# Patient Record
Sex: FEMALE | Race: WHITE | NOT HISPANIC OR LATINO | Employment: STUDENT | URBAN - METROPOLITAN AREA
[De-identification: names, ages, dates, MRNs, and addresses within clinical notes are randomized per-mention and may not be internally consistent; named-entity substitution may affect disease eponyms.]

---
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
## 2019-03-11 ENCOUNTER — APPOINTMENT (OUTPATIENT)
Dept: LAB | Age: 19
End: 2019-03-11

## 2020-06-10 PROCEDURE — 88175 CYTOPATH C/V AUTO FLUID REDO: CPT | Performed by: CLINICAL MEDICAL LABORATORY

## 2020-06-11 ENCOUNTER — LAB REQUISITION (OUTPATIENT)
Dept: LAB | Age: 20
End: 2020-06-11

## 2020-06-11 DIAGNOSIS — Z01.419 ENCOUNTER FOR GYNECOLOGICAL EXAMINATION (GENERAL) (ROUTINE) WITHOUT ABNORMAL FINDINGS: ICD-10-CM

## 2020-06-16 LAB
CASE RPRT: NORMAL
CYTOLOGY CVX/VAG STUDY: NORMAL
PAP EDUCATIONAL NOTE: NORMAL
SPECIMEN ADEQUACY: NORMAL

## 2020-07-05 LAB — HOLD SPECIMEN: NORMAL

## 2024-03-05 ENCOUNTER — APPOINTMENT (OUTPATIENT)
Dept: NEUROLOGY | Facility: CLINIC | Age: 24
End: 2024-03-05
Payer: COMMERCIAL

## 2024-03-05 ENCOUNTER — NON-APPOINTMENT (OUTPATIENT)
Age: 24
End: 2024-03-05

## 2024-03-05 VITALS
HEIGHT: 70.75 IN | WEIGHT: 169 LBS | OXYGEN SATURATION: 98 % | TEMPERATURE: 98.4 F | SYSTOLIC BLOOD PRESSURE: 125 MMHG | HEART RATE: 91 BPM | DIASTOLIC BLOOD PRESSURE: 67 MMHG | BODY MASS INDEX: 23.66 KG/M2

## 2024-03-05 DIAGNOSIS — Z78.9 OTHER SPECIFIED HEALTH STATUS: ICD-10-CM

## 2024-03-05 PROBLEM — Z00.00 ENCOUNTER FOR PREVENTIVE HEALTH EXAMINATION: Status: ACTIVE | Noted: 2024-03-05

## 2024-03-05 PROCEDURE — 99204 OFFICE O/P NEW MOD 45 MIN: CPT

## 2024-03-05 PROCEDURE — 95819 EEG AWAKE AND ASLEEP: CPT

## 2024-03-05 NOTE — HISTORY OF PRESENT ILLNESS
[FreeTextEntry1] :  Gabi is a 23 yo woman. Presenting today due to a loss of consciousness on Thursday, and has a headache since.   At around noon, she was seated then got up to go out.  She was standing, zipped up her coat and had been there for 30 seconds when she lost consciousness. She has no bruise on the head, she believes her legs gave way first and more collapsed down.  The headache was worse and she felt things were moving/waves etc.  There is a sense of pressure, bifrontally and sometimes sharper pains in the glabellum aor occiput.  Feels a bit like she is floating or like a rag doll.  There is chest pain as well that seems correlated.  Chest pain is upper chest, pressure and some sharpness.  Can be with or without movement and worsens when headpain is worse.  No dyspnea.  No worsening or improvement with touch/massage etc.  Tends to improve with being very still.  No palpitations, funny runs.  EKGs x 2 negative.  After being passed out was at [facility] twice (with EKGs).  Had been given Rx's for ondansetron and meclizine, but neither really helped. Sent to [facility] ED - blood work and CT scan of the brain, and prescribed metoclopramide but had not started it yet.  She had blacked out after the blood draw, though brief, but had vomited.  A sense of brain fog since July 2023, worsened in January.  Has trouble with word-finding, mixing up words, forgetting to do simple things such as pay for things, lock her doors, complete zipping up clothing.   Has had one loss of consciousness then as well.  Job was stressful then.  A sense of malaise continues.  Feels her eyes do not focus properly.  There has been a sense of feeling dizzy or lightheaded/pre-syncopal for a while. A circular wavy feeling is new, felt when seated, that is not noted when lying.  Has been on leave this past week.  Headache History: Onset and course:  started weeks ago, but worsened since regaining consciousness. 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  Taking a few days off.

## 2024-03-05 NOTE — DISCUSSION/SUMMARY
[FreeTextEntry1] : Impression: 1) Headaches meeting migraine criteria 2) LOC/presyncope 3) disequilibrium 4) cognitive complaints 5) chest pain - seems connected to headaches  Plan: 1) screen for structural abN:  MRI brain 2) consider migraine as diagnosis of exclusion 3) unclear chest pain issue - can add troponin to labs 4) screen for covid/long-covid with antibodies (exposed during TG), vaccinated in 2023 - some chest pains following with runs etc 5) cardiology referral - [facility]

## 2024-03-05 NOTE — PHYSICAL EXAM
[FreeTextEntry1] : General: Constitutional:  Sitting comfortably in NAD. Psychiatric: well-groomed, appropriate affect, insight/judgment intact Ears, Nose, Throat: no abnormalities, mucus membranes moist Mallampati: 4/4 Neck: supple, no lymphadenopathy or nodules palpable Neck Circumference: Cardiovascular: regular rate and rhythm, normal S1/S2, no murmurs; no upper rib cage tenderness, or side rib cage tenderness with pressure.  Chest: Clear to bases. 	Abdomen: soft, non-tender Extremities: no edema, clubbing or cyanosis Skin:  no rash or neurocutaneous signs   Cognitive: Orientation, language, memory and knowledge screens intact. registration ok, serial 7's to 37 recall 5/5  Cranial Nerves: II: Full to confrontation; disc margins sharp. III/IV/VI: PERRL EOMF No nystagmus [id number]: Symmetric, VII: Face appears symmetric VIII: Normal to screening, IX/X: Palate Elevates Symmetrical  XI: Trapezius Symmetric  XII: Tongue midline  Motor: Power: 5/5 throughout Tone: normal x 4 limbs,  Tremor: none  Sensation:  Intact to light touch.   Coordination/Gait: Finger-nose-finger intact, normal rapid-alternating movements. Fine motor normal with normal rapid finger taps and heel tapping  Narrow based gait, tandem forward ok Hops well on both feet Heel and toe walking normal  Romberg negative  Reflexes: DTR: 1-2+ symmetric x 4 limbs

## 2024-03-08 LAB
ALBUMIN MFR SERPL ELPH: 65.5 %
ALBUMIN SERPL-MCNC: 4.5 G/DL
ALBUMIN/GLOB SERPL: 1.9 RATIO
ALPHA1 GLOB MFR SERPL ELPH: 3.5 %
ALPHA1 GLOB SERPL ELPH-MCNC: 0.2 G/DL
ALPHA2 GLOB MFR SERPL ELPH: 8 %
ALPHA2 GLOB SERPL ELPH-MCNC: 0.6 G/DL
ANA SER IF-ACNC: NEGATIVE
B-GLOBULIN MFR SERPL ELPH: 9.9 %
B-GLOBULIN SERPL ELPH-MCNC: 0.7 G/DL
COVID-19 NUCLEOCAPSID  GAM ANTIBODY INTERPRETATION: POSITIVE
COVID-19 SPIKE DOMAIN ANTIBODY INTERPRETATION: POSITIVE
CRP SERPL-MCNC: <3 MG/L
ERYTHROCYTE [SEDIMENTATION RATE] IN BLOOD BY WESTERGREN METHOD: 2 MM/HR
GAMMA GLOB FLD ELPH-MCNC: 0.9 G/DL
GAMMA GLOB MFR SERPL ELPH: 13.1 %
INTERPRETATION SERPL IEP-IMP: NORMAL
M PROTEIN SPEC IFE-MCNC: NORMAL
PROT SERPL-MCNC: 6.9 G/DL
PROT SERPL-MCNC: 6.9 G/DL
SARS-COV-2 AB SERPL IA-ACNC: >250 U/ML
SARS-COV-2 AB SERPL QL IA: 24.9 INDEX
TROPONIN-T, HIGH SENSITIVITY: <6 NG/L

## 2024-03-13 ENCOUNTER — APPOINTMENT (OUTPATIENT)
Dept: MRI IMAGING | Facility: CLINIC | Age: 24
End: 2024-03-13
Payer: COMMERCIAL

## 2024-03-13 ENCOUNTER — OUTPATIENT (OUTPATIENT)
Dept: OUTPATIENT SERVICES | Facility: HOSPITAL | Age: 24
LOS: 1 days | End: 2024-03-13

## 2024-03-13 ENCOUNTER — TRANSCRIPTION ENCOUNTER (OUTPATIENT)
Age: 24
End: 2024-03-13

## 2024-03-13 PROCEDURE — 70551 MRI BRAIN STEM W/O DYE: CPT | Mod: 26

## 2024-03-20 ENCOUNTER — APPOINTMENT (OUTPATIENT)
Dept: NEUROLOGY | Facility: CLINIC | Age: 24
End: 2024-03-20
Payer: COMMERCIAL

## 2024-03-20 VITALS
DIASTOLIC BLOOD PRESSURE: 82 MMHG | TEMPERATURE: 97.8 F | HEART RATE: 77 BPM | RESPIRATION RATE: 17 BRPM | BODY MASS INDEX: 23.88 KG/M2 | OXYGEN SATURATION: 99 % | SYSTOLIC BLOOD PRESSURE: 128 MMHG | WEIGHT: 170 LBS

## 2024-03-20 PROCEDURE — 99214 OFFICE O/P EST MOD 30 MIN: CPT

## 2024-03-20 NOTE — DISCUSSION/SUMMARY
[FreeTextEntry1] : Impression: 1) Headaches meeting migraine criteria, has not tried daily preventative  2) LOC/presyncope - two episodes last month 3) disequilibrium 4) cognitive complaints 5) chest pain - seems connected to headaches  Plan: 1) Trial of Nortriptyline 10-20mg nightly 2) Vestibular therapy referral  3) Short-term disability paperwork. Will reevaluate in 4 weeks at follow up with Dr. Mata

## 2024-03-20 NOTE — PHYSICAL EXAM
[FreeTextEntry1] : General: Constitutional: Sitting comfortably in NAD. Psychiatric: well-groomed, appropriate affect Ears, Nose, Throat: no abnormalities, mucus membranes moist Neck: supple Extremities: no edema, clubbing or cyanosis Skin: no rash or neuro-cutaneous signs  Cognitive: Orientation, language, memory and knowledge screens intact.  Cranial Nerves: II: LUKE. III/IV/VI: EOM Full. VII: Face appears symmetric. VIII: Normal to screening. IX/X: Normal phonation. XI: Trapezius Symmetric. XII: Tongue midline.  Motor: No tremor Power: No pronator drift.  Normal gait.

## 2024-03-20 NOTE — HISTORY OF PRESENT ILLNESS
[FreeTextEntry1] : 3/20/24 HPI: Gabi is a 24 year old female presenting for a follow up visit for syncope and disequilibrium.   Head pain improving, but still experiences some kind of head pressure and brain fog daily. Dizziness and nausea constant all day long. Chest pain also more bothersome. Troponin normal, scheduled to follow up with cardiology in several weeks.  Dizziness very triggered by motion, such as eyes adjusting to screens or watching train [place] on the subway.   Saw optometrist - increased glasses prescription but otherwise normal exam.   Taking it very easy the past few weeks. Tried going back to work last Monday but symptoms much worsened by screens - notably the dizziness and chest pain. Symptoms worsened within minutes of looking at screens. Taking leave from work per company recommendation.  Tried walking on the treadmill. Able to tolerate 30 minutes but afterwards felt extremely dizzy. Also worsening chest pain with exercise.   Recently started B2 and Magnesium, but not taking any other medications regularly.  ------------------------ Last seen 3/5/24: Presenting today due to a loss of consciousness on Thursday, and has a headache since. At around noon, she was seated then got up to go out. She was standing, zipped up her coat and had been there for 30 seconds when she lost consciousness. She has no bruise on the head, she believes her legs gave way first and more collapsed down. The headache was worse and she felt things were moving/waves etc.  There is a sense of pressure, bifrontally and sometimes sharper pains in the glabellum aor occiput. Feels a bit like she is floating or like a rag doll. There is chest pain as well that seems correlated. Chest pain is upper chest, pressure and some sharpness. Can be with or without movement and worsens when headpain is worse. No dyspnea. No worsening or improvement with touch/massage etc. Tends to improve with being very still. No palpitations, funny runs. EKGs x 2 negative.  [facility] Had been given Rx's for ondansetron and meclizine, but neither really helped. Sent to [facility] ED - blood work and CT scan of the brain, and prescribed metoclopramide but had not started it yet. She had blacked out after the blood draw, though brief, but had vomited.  A sense of brain fog since July 2023, worsened in January. Has trouble with word-finding, mixing up words, forgetting to do simple things such as pay for things, lock her doors, complete zipping up clothing. Has had one loss of consciousness then as well. Job was stressful then.  A sense of malaise continues. Feels her eyes do not focus properly.  There has been a sense of feeling dizzy or lightheaded/pre-syncopal for a while. A circular wavy feeling is new, felt when seated, that is not noted when lying.  Has been on leave this past week.  Headache History: Onset and course: started weeks ago, but worsened since regaining consciousness. 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 Taking a few days off.

## 2024-04-03 ENCOUNTER — NON-APPOINTMENT (OUTPATIENT)
Age: 24
End: 2024-04-03

## 2024-04-03 ENCOUNTER — APPOINTMENT (OUTPATIENT)
Dept: HEART AND VASCULAR | Facility: CLINIC | Age: 24
End: 2024-04-03
Payer: COMMERCIAL

## 2024-04-03 VITALS
HEART RATE: 99 BPM | HEIGHT: 70 IN | OXYGEN SATURATION: 96 % | DIASTOLIC BLOOD PRESSURE: 80 MMHG | SYSTOLIC BLOOD PRESSURE: 123 MMHG | WEIGHT: 172 LBS | TEMPERATURE: 98 F | BODY MASS INDEX: 24.62 KG/M2

## 2024-04-03 PROCEDURE — 36415 COLL VENOUS BLD VENIPUNCTURE: CPT

## 2024-04-03 PROCEDURE — 99205 OFFICE O/P NEW HI 60 MIN: CPT | Mod: 25

## 2024-04-03 PROCEDURE — 93000 ELECTROCARDIOGRAM COMPLETE: CPT

## 2024-04-03 NOTE — HISTORY OF PRESENT ILLNESS
[FreeTextEntry1] :   23 y/o female with h/o migraine, ptsd who presents for initial evaluation today of cp, syncope   notes cp and rest/exertion and bolton no sob at rest  notes syncope and LH w position change no orthopnea, pnd, edema notes fatigue  sister w Faizan Danlos and POTS family h/o asthma in mother  noted 2022 had syncopal event - was talking at work and passed out. 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 w nausea and had recurrent syncopal event after standing up after woke up felt she could not get up - room spinning went to  - ekg normal had headache notes intermittent cp  went to ER 3/24 [facility] w cp, headache had syncopal event 3/24 while sitting down in ER CT head done - wnl, cmp, cbc wnl  seen by neuro - recommended vestibular therapy and nortryptiline (started 2 weeks ago) -MRI brain 3/24: normal -EEG 3/24: normal  sister w POTS  walks for exercise diet healthy stress high - seeing therapist   PMH/PSH: migraine ptsd  ALL: nkda  MEDS: nortriptyline 10 mg qd vit b2 mg fish oil iron zinc  SH: no tobacco no etoh/drugs from [facility] lived [place] for 3 years engaged live w fiance no children 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

## 2024-04-03 NOTE — PHYSICAL EXAM
[Well Developed] : well developed [No Acute Distress] : no acute distress [Well Nourished] : well nourished [Normal Conjunctiva] : normal conjunctiva [Normal Venous Pressure] : normal venous pressure [No Carotid Bruit] : no carotid bruit [Normal S1, S2] : normal S1, S2 [No Murmur] : no murmur [No Rub] : no rub [No Gallop] : no gallop [Clear Lung Fields] : clear lung fields [Good Air Entry] : good air entry [No Respiratory Distress] : no respiratory distress  [Soft] : abdomen soft [Non Tender] : non-tender [No Masses/organomegaly] : no masses/organomegaly [Normal Bowel Sounds] : normal bowel sounds [Normal Gait] : normal gait [No Edema] : no edema [No Cyanosis] : no cyanosis [No Varicosities] : no varicosities [No Clubbing] : no clubbing [No Skin Lesions] : no skin lesions [No Rash] : no rash [Moves all extremities] : moves all extremities [No Focal Deficits] : no focal deficits [Normal Speech] : normal speech [Alert and Oriented] : alert and oriented [Normal memory] : normal memory

## 2024-04-03 NOTE — DISCUSSION/SUMMARY
[Patient] : the patient [___ Month(s)] : in [unfilled] month(s) [EKG obtained to assist in diagnosis and management of assessed problem(s)] : EKG obtained to assist in diagnosis and management of assessed problem(s) [FreeTextEntry1] :  23 y/o female with h/o migraine who presents for initial evaluation today of cp, syncope  -ETT ordered for cp -labs 2023 reviewed - labs ordered today -echo ordered today for syncope -holter ordered for syncope -pulm referral for cp, sob and family h/o asthma  -referral to EP for syncope -ekg ordered today - SR, normal intervals, no st/t change -f/up w neuro - recommended vestibular therapy and nortryptiline -MRI brain 3/24: normal -EEG 3/24: normal -consider genetic testing given sister w ED  -counseled on for cvd risk factors  -f/up 2-3 months for cvd risk factors  I have spent 60 mintues reviewing labs, records, tests and discussed cvd risk factors, cp, syncope evaluation

## 2024-04-04 LAB
ALBUMIN SERPL ELPH-MCNC: 5.2 G/DL
ALP BLD-CCNC: 57 U/L
ALT SERPL-CCNC: 37 U/L
ANION GAP SERPL CALC-SCNC: 11 MMOL/L
APPEARANCE: CLEAR
AST SERPL-CCNC: 26 U/L
BACTERIA: ABNORMAL /HPF
BASOPHILS # BLD AUTO: 0.01 K/UL
BASOPHILS NFR BLD AUTO: 0.2 %
BILIRUB SERPL-MCNC: 0.4 MG/DL
BILIRUBIN URINE: NEGATIVE
BLOOD URINE: NEGATIVE
BUN SERPL-MCNC: 16 MG/DL
CALCIUM SERPL-MCNC: 10.1 MG/DL
CAST: 4 /LPF
CHLORIDE SERPL-SCNC: 103 MMOL/L
CHOLEST SERPL-MCNC: 211 MG/DL
CO2 SERPL-SCNC: 26 MMOL/L
COLOR: NORMAL
CREAT SERPL-MCNC: 0.88 MG/DL
EGFR: 93 ML/MIN/1.73M2
EOSINOPHIL # BLD AUTO: 0.09 K/UL
EOSINOPHIL NFR BLD AUTO: 1.6 %
EPITHELIAL CELLS: 16 /HPF
ESTIMATED AVERAGE GLUCOSE: 100 MG/DL
GLUCOSE QUALITATIVE U: NEGATIVE MG/DL
GLUCOSE SERPL-MCNC: 91 MG/DL
HBA1C MFR BLD HPLC: 5.1 %
HCT VFR BLD CALC: 38.3 %
HDLC SERPL-MCNC: 73 MG/DL
HGB BLD-MCNC: 12.7 G/DL
IMM GRANULOCYTES NFR BLD AUTO: 0.2 %
KETONES URINE: NEGATIVE MG/DL
LDLC SERPL CALC-MCNC: 126 MG/DL
LEUKOCYTE ESTERASE URINE: ABNORMAL
LYMPHOCYTES # BLD AUTO: 1.84 K/UL
LYMPHOCYTES NFR BLD AUTO: 33.2 %
MAGNESIUM SERPL-MCNC: 1.9 MG/DL
MAN DIFF?: NORMAL
MCHC RBC-ENTMCNC: 30 PG
MCHC RBC-ENTMCNC: 33.2 GM/DL
MCV RBC AUTO: 90.3 FL
MICROSCOPIC-UA: NORMAL
MONOCYTES # BLD AUTO: 0.4 K/UL
MONOCYTES NFR BLD AUTO: 7.2 %
NEUTROPHILS # BLD AUTO: 3.19 K/UL
NEUTROPHILS NFR BLD AUTO: 57.6 %
NITRITE URINE: NEGATIVE
NONHDLC SERPL-MCNC: 139 MG/DL
PH URINE: 6
PLATELET # BLD AUTO: 244 K/UL
POTASSIUM SERPL-SCNC: 5.1 MMOL/L
PROT SERPL-MCNC: 7.5 G/DL
PROTEIN URINE: NEGATIVE MG/DL
RBC # BLD: 4.24 M/UL
RBC # FLD: 11.5 %
RED BLOOD CELLS URINE: 4 /HPF
REVIEW: NORMAL
SODIUM SERPL-SCNC: 141 MMOL/L
SPECIFIC GRAVITY URINE: 1.02
TRIGL SERPL-MCNC: 70 MG/DL
TSH SERPL-ACNC: 1.94 UIU/ML
UROBILINOGEN URINE: 0.2 MG/DL
WBC # FLD AUTO: 5.54 K/UL
WHITE BLOOD CELLS URINE: 13 /HPF

## 2024-04-05 ENCOUNTER — OUTPATIENT (OUTPATIENT)
Dept: OUTPATIENT SERVICES | Facility: HOSPITAL | Age: 24
LOS: 1 days | End: 2024-04-05
Payer: COMMERCIAL

## 2024-04-05 DIAGNOSIS — R07.9 CHEST PAIN, UNSPECIFIED: ICD-10-CM

## 2024-04-05 PROCEDURE — 93017 CV STRESS TEST TRACING ONLY: CPT

## 2024-04-05 PROCEDURE — 93016 CV STRESS TEST SUPVJ ONLY: CPT

## 2024-04-05 PROCEDURE — 93018 CV STRESS TEST I&R ONLY: CPT

## 2024-04-10 ENCOUNTER — NON-APPOINTMENT (OUTPATIENT)
Age: 24
End: 2024-04-10

## 2024-04-12 ENCOUNTER — APPOINTMENT (OUTPATIENT)
Dept: HEART AND VASCULAR | Facility: CLINIC | Age: 24
End: 2024-04-12
Payer: COMMERCIAL

## 2024-04-12 VITALS
HEART RATE: 86 BPM | HEIGHT: 70 IN | SYSTOLIC BLOOD PRESSURE: 112 MMHG | BODY MASS INDEX: 24.62 KG/M2 | TEMPERATURE: 98.7 F | DIASTOLIC BLOOD PRESSURE: 75 MMHG | OXYGEN SATURATION: 97 % | WEIGHT: 172 LBS

## 2024-04-12 DIAGNOSIS — I36.1 NONRHEUMATIC TRICUSPID (VALVE) INSUFFICIENCY: ICD-10-CM

## 2024-04-12 PROCEDURE — 93306 TTE W/DOPPLER COMPLETE: CPT

## 2024-04-15 PROBLEM — I36.1 NONRHEUMATIC TRICUSPID VALVE REGURGITATION: Status: ACTIVE | Noted: 2024-04-15

## 2024-04-19 ENCOUNTER — APPOINTMENT (OUTPATIENT)
Dept: NEUROLOGY | Facility: CLINIC | Age: 24
End: 2024-04-19
Payer: COMMERCIAL

## 2024-04-19 VITALS
OXYGEN SATURATION: 96 % | DIASTOLIC BLOOD PRESSURE: 80 MMHG | HEART RATE: 94 BPM | TEMPERATURE: 97.5 F | SYSTOLIC BLOOD PRESSURE: 114 MMHG

## 2024-04-19 PROCEDURE — 99214 OFFICE O/P EST MOD 30 MIN: CPT

## 2024-04-22 ENCOUNTER — NON-APPOINTMENT (OUTPATIENT)
Age: 24
End: 2024-04-22

## 2024-04-22 ENCOUNTER — APPOINTMENT (OUTPATIENT)
Dept: HEART AND VASCULAR | Facility: CLINIC | Age: 24
End: 2024-04-22
Payer: COMMERCIAL

## 2024-04-22 VITALS
BODY MASS INDEX: 24.62 KG/M2 | OXYGEN SATURATION: 98 % | DIASTOLIC BLOOD PRESSURE: 75 MMHG | HEART RATE: 90 BPM | SYSTOLIC BLOOD PRESSURE: 116 MMHG | HEIGHT: 70 IN | WEIGHT: 172 LBS | TEMPERATURE: 97.7 F

## 2024-04-22 PROCEDURE — 99204 OFFICE O/P NEW MOD 45 MIN: CPT | Mod: 25

## 2024-04-22 PROCEDURE — 99214 OFFICE O/P EST MOD 30 MIN: CPT | Mod: 25

## 2024-04-22 PROCEDURE — 93000 ELECTROCARDIOGRAM COMPLETE: CPT

## 2024-04-23 NOTE — HISTORY OF PRESENT ILLNESS
[FreeTextEntry1] : Ms. Gabi Hargrove is a nieves 25 y/o F, with PMHx migraines, ptsd, anxiety. who is referred from Dr. Perez for evaluation of syncope. She states that in February this year, she was feeling a bit sick and nauseas and had 2 episodes of syncope within a few days of each other. These episodes of chest pain were associated with chest pain. She continues to feel similiar chest pain that she describes as a pressure when exerting herself.  She states in the past when she tried to go on runs, she would feel chest pain that would bother her so much that she had to stop. She has a sister with POTS and Faizan Danos. She wore a monitor for 5 days that was normal. She states she has had positive orthostatic vital signs in the past. She also complains of brain fog and generally not feeling like herself.  Patient denies SOB at rest, orthopnea, PND, edema, or other complaints.

## 2024-04-23 NOTE — CARDIOLOGY SUMMARY
[de-identified] : EKG 04/22/2024: NSR @ 69 bpm with RBBB  [de-identified] : 5 day Bi[employer] April 2024: NSR. Average HR 84 bpm.  [de-identified] : NST 04/05/2024: no arrythmias. ECG negative for ischemia  [de-identified] : ECHO 04/12/2024: 1. Left ventricular cavity is normal in size. Left ventricular wall thickness is normal. Left ventricular systolic function is normal with an ejection fraction of 66 % by Phillips's method of disks. There are no regional wall motion abnormalities seen. 2. Normal left ventricular diastolic function. 3. Normal right ventricular cavity size, with normal wall thickness, and normal systolic function. 4. No significant valvular disease. 5. No pericardial effusion seen.

## 2024-04-23 NOTE — ADDENDUM
[FreeTextEntry1] :  I, Raegan Paz, am scribing for and the presence of Dr. Conor Kingsley the following sections: HPI, PMH,Family/social history, ROS, Physical Exam, Assessment / Plan. I, Conor Kingsley, hereby attest that the medical record entry for this patient accurately reflects signatures/notations that I made on the Date of Service in my capacity as an Attending Physician when I treated/diagnosed the above patient. I do hereby attest that this information is true, accurate and complete to the best of my knowledge. I was present for the entire visit and supervised the entire visit and made/agree with the plan as outlined.

## 2024-04-23 NOTE — DISCUSSION/SUMMARY
[EKG obtained to assist in diagnosis and management of assessed problem(s)] : EKG obtained to assist in diagnosis and management of assessed problem(s) [FreeTextEntry1] : Ms. Gabi Hargrove is a nieves 25 y/o F, with PMHx migraines, ptsd, anxiety. who is referred from Dr. Perez for evaluation of syncope.    1) Dysautonomia - Patient endorses over the past few years she has probably passed out about 5 times. These episodes appear to be vaso-vagal in nature as she feels nauseas before they occur. She also states she has episodes where she feels like she is about to faint when going from sitting to standing, so we suspect there is a low blood pressure component. Her hypotension could be contributing to her vague symptoms such as brain fog and headaches. We presented her with 3 options.      1) Lifestyle Modifications - She will increase salt in her diet, including salty foods or an electrolyte tablet/Gatorade/or other sports drink in the morning and before she exercises. We stressed the importance of increasing salt consumption to maintain proper hydration levels.  She also understands that she should be eating 3 square meals a day. She should also increase her exercise to 4 times/week for an hour and making sure that she drinks water/electrolytes prior to working out. We recommended wearing compression stockings with a compression level of 15-20 mmHg to help increase her pressure.       2)  If the above fails, we discussed that there are options such as midodrine, florinef, or ivabradine that can help with her symptoms.       3) Finally, if all else fails, counselled that there is an option for an ablation, however, we do not feel she is a candidate for this at this time and would be a potential solution in the far future.  - Additionally, given her sister carries the diagnosis of POTS and Faizan Danos, she should get a formal evaluation of Faizan-Danos. She is in the process of finding a doctor for formal evaluation.   2) Chest Pain  - As far as her chest pain, she did not have any arrythmias on her 5 day monitor that correlate with her symptoms of chest pain. She had a NST that was normal. We will see if her symptoms improve with lifestyle modifications as above.   She agrees to trial lifestyle modifications and is aware it may take 3-6 months to see improvement in her symptoms. She will follow-up in 4 months or sooner if she has any questions.  We will likely repeat a monitor at the time.

## 2024-04-23 NOTE — REVIEW OF SYSTEMS
[Headache] : headache [Blurry Vision] : blurred vision [Chest Discomfort] : chest discomfort [Palpitations] : palpitations [Syncope] : syncope [Nausea] : nausea [Dizziness] : dizziness [Under Stress] : under stress [Negative] : Heme/Lymph [SOB] : no shortness of breath [Dyspnea on exertion] : not dyspnea during exertion [Lower Ext Edema] : no extremity edema [Leg Claudication] : no intermittent leg claudication [Orthopnea] : no orthopnea [PND] : no PND [Cough] : no cough

## 2024-04-24 ENCOUNTER — NON-APPOINTMENT (OUTPATIENT)
Age: 24
End: 2024-04-24

## 2024-04-25 PROBLEM — R07.9 CHEST PAIN, UNSPECIFIED TYPE: Status: ACTIVE | Noted: 2024-04-03

## 2024-05-02 ENCOUNTER — APPOINTMENT (OUTPATIENT)
Dept: PULMONOLOGY | Facility: CLINIC | Age: 24
End: 2024-05-02
Payer: COMMERCIAL

## 2024-05-02 VITALS
HEART RATE: 79 BPM | HEIGHT: 70 IN | BODY MASS INDEX: 24.48 KG/M2 | OXYGEN SATURATION: 99 % | DIASTOLIC BLOOD PRESSURE: 71 MMHG | WEIGHT: 171 LBS | SYSTOLIC BLOOD PRESSURE: 105 MMHG | TEMPERATURE: 97.7 F

## 2024-05-02 DIAGNOSIS — R94.2 ABNORMAL RESULTS OF PULMONARY FUNCTION STUDIES: ICD-10-CM

## 2024-05-02 DIAGNOSIS — R53.83 OTHER FATIGUE: ICD-10-CM

## 2024-05-02 DIAGNOSIS — R07.9 CHEST PAIN, UNSPECIFIED: ICD-10-CM

## 2024-05-02 PROCEDURE — ZZZZZ: CPT | Mod: 1L

## 2024-05-02 PROCEDURE — 94729 DIFFUSING CAPACITY: CPT

## 2024-05-02 PROCEDURE — 94010 BREATHING CAPACITY TEST: CPT

## 2024-05-02 PROCEDURE — 94618 PULMONARY STRESS TESTING: CPT

## 2024-05-02 PROCEDURE — 99204 OFFICE O/P NEW MOD 45 MIN: CPT | Mod: 25

## 2024-05-02 PROCEDURE — 94727 GAS DIL/WSHOT DETER LNG VOL: CPT

## 2024-05-02 NOTE — REVIEW OF SYSTEMS
[Negative] : Endocrine [Dyspnea] : dyspnea [SOB on Exertion] : sob on exertion [Chest Discomfort] : chest discomfort [Headache] : headache [Dizziness] : dizziness [Anxiety] : anxiety [Anemia] : no anemia

## 2024-05-02 NOTE — DISCUSSION/SUMMARY
[FreeTextEntry1] : ATTENDING'S SUMMARY: 5-2-24: no pallor, no icterus no JVD, no hepatojugular reflux, no HSM no cervical adenopathy, no supraclavicular adenopathy normal s1/s2, no murmurs, rubs or gallops, no tricuspid regurg murmur good air entry bilaterally, no wheezing, rhonchi or crackles no cyanosis, no clubbing, no articular manifestations, no thickening of the skin no evidence of hyperextension of the wrist, thumb flexion/extension of head/neck is normal  no pedal edema

## 2024-05-02 NOTE — PROCEDURE
[FreeTextEntry1] : PFT 24 FVC: 4.81 L (106%) FEV1: 3.66 L (95%) FEV1/FVC: 76% FEF 25-75%: 2.80 L/s (70%) T.81 L (98%) RV/T% DLCO: 18.61 (59%) FENO: 8 ppb  6MWT 24: 664 meters, SpO2 98% -> 97%

## 2024-05-02 NOTE — ADDENDUM
[FreeTextEntry1] : I, Tayler Silva, am scribing for and in the presence of Dr. Mekhi Watson the following sections HISTORY OF PRESENT ILLNESSS, PAST MEDICAL/FAMILY/SOCIAL HISTORY; REVIEW OF SYSTEMS; VITAL SIGNS; PHYSICAL EXAM; DISPOSITION  Spent 60 min with patient getting full history, talking with Dr. Muniz while patient waited, asthma diagnosis, discussing PFTs and DLCO reduction, tests to evaluate low DLCO   I, Dr Mekhi Watson, personally performed the evaluation and management services for consultation. The E/M includes conducting patient interview, detailed physical examination, assessing all conditions, reviewing all investigations and images, and establishing a plan of care for this new patient. Today, my PA, Ms. Tayler Silva was present at the patient's assessment. She did not examine the patient nor partake in any decision-making process. She listened to my discussions with the patient. I discussed future management with the patient with her. She acted as a scribe in writing the note that was dictated by me

## 2024-05-02 NOTE — ASSESSMENT
[FreeTextEntry1] : 5-2-24:  It was a pleasure to meet Gabi today in consultation. Her respiratory issues are summarized:  1. Chest pain Gabi has been having chest pains associated with shortness of breath, headaches, dizziness, brain fog since January. She has passed out twice.  Possible causes include: a. Asthma. Mother has asthma. FEV1 is normal. FEV1/FVC is at the lower limit of normal. There is no concavity in flow volume loop; however FEF 25-75% is 70% of predicted. It is possible there is minimal small airways obstruction. We will order pursue a methacholine challenge. b. Connective tissue disease. Her sister has Ehler's Danlos. She has nothing to suggest pulmonary issues. No evidence of tracheobronchomalacia on physical examination. She is being evaluated by a neurologist. JOCELYN was negative. We'll check RF c. COVID IgG elevated. She denies ever testing positive for COVID. This could explain some of her symptoms, such as brain fog. DLCO is moderately reduced. We will pursue work up for decreased diffusion capacity (see #2) c. Blood clots. On progesterone IUD (Mirena). Ordered d-dimer. We'll check for leg dopplers.  2. Decreased diffusion capacity DLCO is moderately reduced, 18.61 (59% of predicted). Possible causes include: a. Pulmonary hypertension. PASP is normal, 7mmHg b. Anemia, Hgb normal c. Blood clots. On progesterone IUD (Mirena). Ordered d-dimer. We'll check for leg dopplers.  Return to clinic: 3 months

## 2024-05-02 NOTE — HISTORY OF PRESENT ILLNESS
[TextBox_4] : 23 yo F referred by Dr. Muniz for chest pains starting in late January, anxiety, headaches Chest pains are sharp, on and off. It became more frequent in march, intermittent in march. She would have it when she did excessive cardio, also at rest. She would have times when she can do cardio without symptoms It feels like a pressure in the center of her chest Associated with shortness of breath It does not wake her up at night She has been having brain fog, nausea, headaches. She passed out twice (Feb 29, March 1st). She is felt to have vestibular migraines Mother with asthma She lives in [place] [id number] She works in [employer], private equity for 3+ years She has a history of anxiety, started zoloft and nortriptyline Her sister has POTS and ehler's danlos She continues to exercise, stationary bike, pilates She has never actually tested positive for covid, IgG ab elevated on 3/5/24

## 2024-05-03 LAB
DEPRECATED D DIMER PPP IA-ACNC: <150 NG/ML DDU
RHEUMATOID FACT SER QL: 16 IU/ML

## 2024-05-05 ENCOUNTER — OUTPATIENT (OUTPATIENT)
Dept: OUTPATIENT SERVICES | Facility: HOSPITAL | Age: 24
LOS: 1 days | End: 2024-05-05

## 2024-05-05 ENCOUNTER — APPOINTMENT (OUTPATIENT)
Dept: ULTRASOUND IMAGING | Facility: HOSPITAL | Age: 24
End: 2024-05-05
Payer: COMMERCIAL

## 2024-05-05 PROCEDURE — 93970 EXTREMITY STUDY: CPT | Mod: 26

## 2024-05-05 PROCEDURE — 93970 EXTREMITY STUDY: CPT

## 2024-05-06 ENCOUNTER — TRANSCRIPTION ENCOUNTER (OUTPATIENT)
Age: 24
End: 2024-05-06

## 2024-05-09 NOTE — PHYSICAL EXAM
[FreeTextEntry1] : General: Constitutional:  Sitting comfortably in NAD. Psychiatric: well-groomed, appropriate affect Ears, Nose, Throat: no abnormalities, mucus membranes moist Neck: supple Extremities: no edema, clubbing or cyanosis Skin: no rash or neuro-cutaneous signs   Cognitive: Orientation, language, memory and knowledge screens intact.  Cranial Nerves: II: LUKE. III/IV/VI: EOM Full.  VII: Face appears symmetric VIII: Normal to screening IX/X: normal phonation  XI: Trapezius Symmetric  XII: Tongue midline Motor: Power: no pronator drift  Narrow based gait

## 2024-05-09 NOTE — HISTORY OF PRESENT ILLNESS
[FreeTextEntry1] : 4/19/24 HPI: Gabi is a 24 year old female presenting for a follow up visit for syncope and disequilibrium.   Seen by Dr. Muniz - feels she was cleared for cardiac pathology. However, she is still having a lot of symptoms still - chest pain randomly, often when walking. Nortriptyline still at 10mg qhs.  Has taken 20 at times. Vestibular therapist - eye tracking seemed to look fine, but after the test she felt like she was on a boat. Following the recommendations.  Has nausea today - believes it may be related to sertraline starting, now at 50mg.  Believes stress may be related.  When in moving vehicles, trains and cars, will feel off-balance and woozy. Also watching someone else scroll through their phone will cause the nausea and disequilibrium.  Headache used to be very frontal - and now feels like it starts from the back of her head and comes forward like a helmet.  They are shorter,, lasting 90-120min, but severe.  Tries to sleep with them.  Brain fog and doing carisa ethings like putting hot sauce in the closert. May suddenly go silent in the middle of a conversation without knowing it. Has trouble reading through multi-paragraph text.  Sleep - restless and multiple arousals. Feels more stable on her feet and now starting to take naps during the day, which is helping. No LOC since Mar 1, 2024, but has last vision. To f/u with pulmonologist and EP.   Nortriptyline 10-20mg nightly -  Vestibular therapy -  Short term disability -   ------------------------- Last seen 3/20/24: Head pain improving, but still experiences some kind of head pressure and brain fog daily. Dizziness and nausea constant all day long. Chest pain also more bothersome. Troponin normal, scheduled to follow up with cardiology in several weeks.  Dizziness very triggered by motion, such as eyes adjusting to screens or watching train pass [place] on the subway.  Saw optometrist - increased glasses prescription but otherwise normal exam.  Taking it very easy the past few weeks. Tried going back to work last Monday but symptoms much worsened by screens - notably the dizziness and chest pain. Symptoms worsened within minutes of looking at screens. Taking leave from work per company recommendation.  Tried walking on the treadmill. Able to tolerate 30 minutes but afterwards felt extremely dizzy. Also worsening chest pain with exercise.  Recently started B2 and Magnesium, but not taking any other medications regularly. ------------------------ Last seen 3/5/24: Presenting today due to a loss of consciousness on Thursday, and has a headache since. At around noon, she was seated then got up to go out. She was standing, zipped up her coat and had been there for 30 seconds when she lost consciousness. She has no bruise on the head, she believes her legs gave way first and more collapsed down. The headache was worse and she felt things were moving/waves etc.  There is a sense of pressure, bifrontally and sometimes sharper pains in the glabellum aor occiput. Feels a bit like she is floating or like a rag doll. There is chest pain as well that seems correlated. Chest pain is upper chest, pressure and some sharpness. Can be with or without movement and worsens when headpain is worse. No dyspnea. No worsening or improvement with touch/massage etc. Tends to improve with being very still. No palpitations, funny runs. EKGs x 2 negative.  After being passed out was [facility] (with [facility]). Had been given Rx's for ondansetron and meclizine, but neither really helped. Sent to [facility] ED - blood work and CT scan of the brain, and prescribed metoclopramide but had not started it yet. She had blacked out after the blood draw, though brief, but had vomited.  A sense of brain fog since July 2023, worsened in January. Has trouble with word-finding, mixing up words, forgetting to do simple things such as pay for things, lock her doors, complete zipping up clothing. Has had one loss of consciousness then as well. Job was stressful then.  A sense of malaise continues. Feels her eyes do not focus properly.  There has been a sense of feeling dizzy or lightheaded/pre-syncopal for a while. A circular wavy feeling is new, felt when seated, that is not noted when lying.  Has been on leave this past week.  Headache History: Onset and course: started weeks ago, but worsened since regaining consciousness. 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 Taking a few days off.

## 2024-05-09 NOTE — END OF VISIT
[FreeTextEntry3] : I, Dr. Mata, attest that this documentation has been co-prepared by Radha Farrar under my direction and presence.

## 2024-05-09 NOTE — DISCUSSION/SUMMARY
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 2) to be cleared by pulmonology and EP 3) reassess progress in 4-6 weeks, to decide on plan for return to work, Short-term disability.

## 2024-05-22 ENCOUNTER — APPOINTMENT (OUTPATIENT)
Dept: NEUROLOGY | Facility: CLINIC | Age: 24
End: 2024-05-22
Payer: COMMERCIAL

## 2024-05-22 VITALS
DIASTOLIC BLOOD PRESSURE: 84 MMHG | HEART RATE: 111 BPM | OXYGEN SATURATION: 99 % | HEIGHT: 70 IN | SYSTOLIC BLOOD PRESSURE: 125 MMHG | TEMPERATURE: 207.32 F

## 2024-05-22 DIAGNOSIS — R55 SYNCOPE AND COLLAPSE: ICD-10-CM

## 2024-05-22 DIAGNOSIS — R42 DIZZINESS AND GIDDINESS: ICD-10-CM

## 2024-05-22 DIAGNOSIS — R51.9 HEADACHE, UNSPECIFIED: ICD-10-CM

## 2024-05-22 PROCEDURE — 99214 OFFICE O/P EST MOD 30 MIN: CPT

## 2024-05-22 RX ORDER — SERTRALINE HYDROCHLORIDE 50 MG/1
50 TABLET, FILM COATED ORAL DAILY
Refills: 0 | Status: ACTIVE | COMMUNITY

## 2024-05-22 RX ORDER — SERTRALINE HYDROCHLORIDE 25 MG/1
25 TABLET, FILM COATED ORAL
Refills: 0 | Status: DISCONTINUED | COMMUNITY
End: 2024-05-22

## 2024-05-22 NOTE — HISTORY OF PRESENT ILLNESS
[FreeTextEntry1] : 5/22/24 HPI: Gabi is a 24 year old female presenting for a follow up visit for syncope and disequilibrium.   Dogs had food poisoning for the past two weeks. Has not been sleeping very well. [place]ucho was out of town, so she was the only one home to care for her dogs. Previously was improving on Nortriptyline 20mg - had some headache free days, chest pain decreased in frequency. [place]o returned yesterday and she was able to get a good nights sleep last night.   Pulmonology currently working up for asthma. EP noted low BP, recommended compression socks and staying hydrated.   Ready to try going back to work, scheduled to start again on Tuesday. With her field, does not think accommodations or reduced hours are feasible but we can try.  ----------------------------- Last seen 4/19/24: Seen by Dr. Muniz - feels she was cleared for cardiac pathology. However, she is still having a lot of symptoms still - chest pain randomly, often when walking. Nortriptyline still at 10mg qhs. Has taken 20 at times. Vestibular therapist - eye tracking seemed to look fine, but after the test she felt like she was on a boat. Following the recommendations.  Has nausea today - believes it may be related to sertraline starting, now at 50mg. Believes stress may be related.  When in moving vehicles, trains and cars, will feel off-balance and woozy. Also watching someone else scroll through their phone will cause the nausea and disequilibrium.  Headache used to be very frontal - and now feels like it starts from the back of her head and comes forward like a helmet. They are shorter,, lasting 90-120min, but severe. Tries to sleep with them.  Brain fog and doing carisa ethings like putting hot sauce in the closert. May suddenly go silent in the middle of a conversation without knowing it. Has trouble reading through multi-paragraph text.  Sleep - restless and multiple arousals. Feels more stable on her feet and now starting to take naps during the day, which is helping. No LOC since Mar 1, 2024, but has last vision. To f/u with pulmonologist and EP.

## 2024-05-22 NOTE — PHYSICAL EXAM
[FreeTextEntry1] : General: Constitutional: Sitting comfortably in NAD. Psychiatric: well-groomed, appropriate affect Ears, Nose, Throat: no abnormalities, mucus membranes moist Neck: supple Extremities: no edema, clubbing or cyanosis Skin: no rash or neuro-cutaneous signs  Cognitive: Orientation, language, memory and knowledge screens intact.  Cranial Nerves: II: LUKE. III/IV/VI: EOM Full. VII: Face appears symmetric. VIII: Normal to screening. IX/X: Normal phonation. XI: Trapezius Symmetric. XII: Tongue midline.  Motor: Power: No pronator drift.  Normal gait.

## 2024-05-22 NOTE — DISCUSSION/SUMMARY
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

## 2024-05-30 ENCOUNTER — NON-APPOINTMENT (OUTPATIENT)
Age: 24
End: 2024-05-30

## 2024-05-30 ENCOUNTER — OUTPATIENT (OUTPATIENT)
Dept: OUTPATIENT SERVICES | Facility: HOSPITAL | Age: 24
LOS: 1 days | End: 2024-05-30
Payer: COMMERCIAL

## 2024-05-30 DIAGNOSIS — R94.2 ABNORMAL RESULTS OF PULMONARY FUNCTION STUDIES: ICD-10-CM

## 2024-05-30 PROCEDURE — 94070 EVALUATION OF WHEEZING: CPT

## 2024-05-30 PROCEDURE — 94070 EVALUATION OF WHEEZING: CPT | Mod: 26

## 2024-06-18 ENCOUNTER — RX RENEWAL (OUTPATIENT)
Age: 24
End: 2024-06-18

## 2024-06-19 ENCOUNTER — RX RENEWAL (OUTPATIENT)
Age: 24
End: 2024-06-19

## 2024-06-19 RX ORDER — NORTRIPTYLINE HYDROCHLORIDE 10 MG/1
10 CAPSULE ORAL
Qty: 60 | Refills: 3 | Status: ACTIVE | COMMUNITY
Start: 2024-03-20 | End: 1900-01-01